# Patient Record
(demographics unavailable — no encounter records)

---

## 2024-11-15 NOTE — ASSESSMENT
[FreeTextEntry1] : .constipation- chronic with acute episode  advised to increase fluid intake to 2 bottles then slowly to 6 bottles a day  - increase fiber in diet - encouraged fruits and vegetables  -start colase 200 po qhs  - senna 6.8 2 tab now then q 3 days as needed   Insomnia - due to night work  - sleep hygiene reviewed  -avoid electronics  -limit caffein intake  -trial of melatonin 3 mg   HX Asthma - refilled albuterol and advair  -pulm f/u advised  CT lungs 1/7/21 clear  ECHO 1/6/21 nl  PFT 12/2020  irritated chest keloid   HCM  Flu vaccine - 11/15/24  PAP- 6/2023  std offered pt refused  Tdap- 10/30/23  Pfizer 4/1/2021 , 4/23/21  .

## 2024-11-15 NOTE — HISTORY OF PRESENT ILLNESS
[Spouse] : spouse [de-identified] : 34-year-old woman, history of asthma, Covid in the spring, here for CPE  moved to [facility] brought home  working night shifts - [employer]  - 2 kid -6 yrs and 1 yr old boy   fainted after delivery saw MD - low BP   had pain on the right side 10/17/24- was bad thought muscle pain - tjen could not tolerate went to ER - did BW US neg CT showed lot of stool - took mirilax colase metamucil - going doesnot know if emptying   HHX Asthma -has not seen her pulm -in a while - not using her inhalers as   - gained weight getting winded with exercise   CT lungs 21 clear  ECHO 21 nl  PFT 2020

## 2024-11-15 NOTE — HEALTH RISK ASSESSMENT
[Good] : ~his/her~  mood as  good [No] : In the past 12 months have you used drugs other than those required for medical reasons? 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